# Patient Record
Sex: FEMALE | Race: WHITE | NOT HISPANIC OR LATINO | URBAN - METROPOLITAN AREA
[De-identification: names, ages, dates, MRNs, and addresses within clinical notes are randomized per-mention and may not be internally consistent; named-entity substitution may affect disease eponyms.]

---

## 2017-02-21 ENCOUNTER — IMPORTED ENCOUNTER (OUTPATIENT)
Dept: URBAN - METROPOLITAN AREA CLINIC 9 | Facility: CLINIC | Age: 77
End: 2017-02-21

## 2017-03-08 ENCOUNTER — IMPORTED ENCOUNTER (OUTPATIENT)
Dept: URBAN - METROPOLITAN AREA CLINIC 9 | Facility: CLINIC | Age: 77
End: 2017-03-08

## 2017-04-12 ENCOUNTER — IMPORTED ENCOUNTER (OUTPATIENT)
Dept: URBAN - METROPOLITAN AREA CLINIC 9 | Facility: CLINIC | Age: 77
End: 2017-04-12

## 2017-05-24 ENCOUNTER — IMPORTED ENCOUNTER (OUTPATIENT)
Dept: URBAN - METROPOLITAN AREA CLINIC 9 | Facility: CLINIC | Age: 77
End: 2017-05-24

## 2017-11-29 ENCOUNTER — IMPORTED ENCOUNTER (OUTPATIENT)
Dept: URBAN - METROPOLITAN AREA CLINIC 9 | Facility: CLINIC | Age: 77
End: 2017-11-29

## 2018-02-23 NOTE — PATIENT DISCUSSION
(H01.005) Unspecified blepharitis left lower eyelid - Assesment : Examination revealed Blepharitis. - Plan : Warm soaks with massage to eyelid two times daily.

## 2018-02-23 NOTE — PATIENT DISCUSSION
(H00.025) Hordeolum internum left lower eyelid - Assesment : Examination revealed internal hordeolum LLL. - Plan : Recommend warm compresses OS (or OU) BID for 1-2 weeks, then nightly, then 2-3x/weekly thereafter. Patient had been on an oral antibiotic after seeing MD out [address], finish treatment then discontinue. Advised condition will improve over next several weeks. Pt also states using ophthalmic Rx ointment, continue using thin film on LLL after warm compresses. RV as needed. Pt is part time in [place].

## 2018-02-23 NOTE — PATIENT DISCUSSION
(H01.002) Unspecified blepharitis right lower eyelid - Assesment : Examination revealed Blepharitis. - Plan : Warm soaks with massage to eyelid two times daily.

## 2018-12-04 ENCOUNTER — IMPORTED ENCOUNTER (OUTPATIENT)
Dept: URBAN - METROPOLITAN AREA CLINIC 9 | Facility: CLINIC | Age: 78
End: 2018-12-04

## 2019-02-26 ENCOUNTER — IMPORTED ENCOUNTER (OUTPATIENT)
Dept: URBAN - METROPOLITAN AREA CLINIC 9 | Facility: CLINIC | Age: 79
End: 2019-02-26

## 2019-06-04 ENCOUNTER — IMPORTED ENCOUNTER (OUTPATIENT)
Dept: URBAN - METROPOLITAN AREA CLINIC 9 | Facility: CLINIC | Age: 79
End: 2019-06-04

## 2019-12-03 ENCOUNTER — IMPORTED ENCOUNTER (OUTPATIENT)
Dept: URBAN - METROPOLITAN AREA CLINIC 9 | Facility: CLINIC | Age: 79
End: 2019-12-03

## 2020-12-09 ENCOUNTER — IMPORTED ENCOUNTER (OUTPATIENT)
Dept: URBAN - METROPOLITAN AREA CLINIC 9 | Facility: CLINIC | Age: 80
End: 2020-12-09

## 2021-10-18 ASSESSMENT — TONOMETRY
OD_IOP_MMHG: 17
OD_IOP_MMHG: 13
OS_IOP_MMHG: 14
OS_IOP_MMHG: 14
OS_IOP_MMHG: 16
OS_IOP_MMHG: 18
OS_IOP_MMHG: 15
OD_IOP_MMHG: 14
OS_IOP_MMHG: 17
OS_IOP_MMHG: 13
OD_IOP_MMHG: 18
OD_IOP_MMHG: 14
OD_IOP_MMHG: 17
OD_IOP_MMHG: 17
OD_IOP_MMHG: 14
OD_IOP_MMHG: 15

## 2021-10-18 ASSESSMENT — VISUAL ACUITY
OD_CC: 20/20 -3 SN
OS_CC: 20/25 SN
OS_SC: 20/30 -2 SN
OD_CC: 20/20 - SN
OS_SC: 20/40 + SN
OD_SC: 20/20 -2 SN
OS_CC: 20/25 SN
OD_SC: 20/30 -2 SN
OD_SC: 20/30 -2 SN
OS_SC: 20/30 + SN
OS_SC: 20/40 - SN
OS_SC: 20/40 SN
OD_SC: 20/20 -2 SN
OD_SC: 20/25 SN
OS_SC: 20/40 SN
OD_SC: 20/40 -2 SN
OD_SC: 20/30 -2 SN
OS_CC: 20/20 -2 SN
OD_SC: 20/30 - SN
OS_SC: 20/40 - SN
OS_SC: 20/20 -2 SN
OS_SC: 20/40 + SN
OD_SC: 20/30 -2 SN
OS_SC: 20/25 -2 SN
OD_SC: 20/30 - SN
OD_CC: 20/20 - SN

## 2021-12-14 ENCOUNTER — ESTABLISHED PATIENT (OUTPATIENT)
Dept: URBAN - METROPOLITAN AREA CLINIC 14 | Facility: CLINIC | Age: 81
End: 2021-12-14

## 2021-12-14 DIAGNOSIS — H35.372: ICD-10-CM

## 2021-12-14 DIAGNOSIS — Z96.1: ICD-10-CM

## 2021-12-14 DIAGNOSIS — H04.123: ICD-10-CM

## 2021-12-14 PROCEDURE — 92134 CPTRZ OPH DX IMG PST SGM RTA: CPT

## 2021-12-14 PROCEDURE — 92015 DETERMINE REFRACTIVE STATE: CPT

## 2021-12-14 PROCEDURE — 92014 COMPRE OPH EXAM EST PT 1/>: CPT

## 2021-12-14 ASSESSMENT — VISUAL ACUITY
OS_GLARE: 20/70
OD_SC: 20/25+1
OD_GLARE: 20/30
OS_SC: 20/40+1

## 2021-12-14 ASSESSMENT — TONOMETRY
OS_IOP_MMHG: 10
OD_IOP_MMHG: 11

## 2022-11-21 ENCOUNTER — EMERGENCY VISIT (OUTPATIENT)
Dept: URBAN - METROPOLITAN AREA CLINIC 9 | Facility: CLINIC | Age: 82
End: 2022-11-21

## 2022-11-21 DIAGNOSIS — H00.15: ICD-10-CM

## 2022-11-21 PROCEDURE — 92012 INTRM OPH EXAM EST PATIENT: CPT

## 2022-11-21 RX ORDER — DOXYCYCLINE HYCLATE 100MG 100 MG/1
1 CAPSULE ORAL TWICE A DAY
Start: 2022-11-21 | End: 2022-11-28

## 2022-11-21 RX ORDER — NEOMYCIN SULFATE, POLYMYXIN B SULFATE AND DEXAMETHASONE 3.5; 10000; 1 MG/G; [USP'U]/G; MG/G
OINTMENT OPHTHALMIC EVERY EVENING
Start: 2022-11-21 | End: 2022-11-28

## 2022-11-21 ASSESSMENT — VISUAL ACUITY
OD_CC: 20/20
OS_CC: 20/20
OD_SC: 20/25
OS_SC: 20/40
OU_CC: 20/20
OU_SC: 20/25

## 2022-11-21 ASSESSMENT — TONOMETRY
OS_IOP_MMHG: 12
OD_IOP_MMHG: 12

## 2022-12-28 ENCOUNTER — ESTABLISHED PATIENT (OUTPATIENT)
Dept: URBAN - METROPOLITAN AREA CLINIC 14 | Facility: CLINIC | Age: 82
End: 2022-12-28

## 2022-12-28 PROCEDURE — 92014 COMPRE OPH EXAM EST PT 1/>: CPT

## 2022-12-28 PROCEDURE — 92134 CPTRZ OPH DX IMG PST SGM RTA: CPT

## 2022-12-28 PROCEDURE — 92015 DETERMINE REFRACTIVE STATE: CPT

## 2022-12-28 ASSESSMENT — VISUAL ACUITY
OS_SC: 20/40-1
OU_SC: 20/30-2
OD_SC: 20/30-2

## 2022-12-28 ASSESSMENT — TONOMETRY
OS_IOP_MMHG: 19
OD_IOP_MMHG: 17